# Patient Record
Sex: MALE | Race: WHITE
[De-identification: names, ages, dates, MRNs, and addresses within clinical notes are randomized per-mention and may not be internally consistent; named-entity substitution may affect disease eponyms.]

---

## 2018-05-26 ENCOUNTER — HOSPITAL ENCOUNTER (EMERGENCY)
Dept: HOSPITAL 45 - C.EDC | Age: 48
Discharge: HOME | End: 2018-05-26
Payer: COMMERCIAL

## 2018-05-26 VITALS
WEIGHT: 203.93 LBS | HEIGHT: 69.02 IN | WEIGHT: 203.93 LBS | BODY MASS INDEX: 30.2 KG/M2 | HEIGHT: 69.02 IN | BODY MASS INDEX: 30.2 KG/M2

## 2018-05-26 VITALS — OXYGEN SATURATION: 97 %

## 2018-05-26 VITALS — TEMPERATURE: 99.86 F

## 2018-05-26 VITALS — DIASTOLIC BLOOD PRESSURE: 62 MMHG | OXYGEN SATURATION: 98 % | SYSTOLIC BLOOD PRESSURE: 102 MMHG | HEART RATE: 92 BPM

## 2018-05-26 DIAGNOSIS — B34.9: Primary | ICD-10-CM

## 2018-05-26 DIAGNOSIS — R50.9: ICD-10-CM

## 2018-05-26 DIAGNOSIS — Z79.899: ICD-10-CM

## 2018-05-26 DIAGNOSIS — Z88.8: ICD-10-CM

## 2018-05-26 LAB
BASOPHILS # BLD: 0.01 K/UL (ref 0–0.2)
BASOPHILS NFR BLD: 0.2 %
BUN SERPL-MCNC: 8 MG/DL (ref 7–18)
CA-I BLD-SCNC: 1.08 MMOL/L (ref 1.12–1.32)
CALCIUM SERPL-MCNC: 8.2 MG/DL (ref 8.5–10.1)
CO2 SERPL-SCNC: 20 MMOL/L (ref 21–32)
CREAT BLD-MCNC: 1.2 MG/DL (ref 0.6–1.3)
CREAT SERPL-MCNC: 1.26 MG/DL (ref 0.6–1.4)
EOS ABS #: 0.01 K/UL (ref 0–0.5)
EOSINOPHIL NFR BLD AUTO: 161 K/UL (ref 130–400)
GLUCOSE SERPL-MCNC: 89 MG/DL (ref 70–99)
HCT VFR BLD CALC: 46.1 % (ref 42–52)
HGB BLD-MCNC: 16.1 G/DL (ref 14–18)
IG#: 0.03 K/UL (ref 0–0.02)
IMM GRANULOCYTES NFR BLD AUTO: 7.9 %
INR PPP: 1 (ref 0.9–1.1)
ISTAT POTASSIUM: 3.4 MEQ/L (ref 3.3–5)
LYMPHOCYTES # BLD: 0.46 K/UL (ref 1.2–3.4)
MCH RBC QN AUTO: 32.7 PG (ref 25–34)
MCHC RBC AUTO-ENTMCNC: 34.9 G/DL (ref 32–36)
MCV RBC AUTO: 93.5 FL (ref 80–100)
MONO ABS #: 0.01 K/UL (ref 0.11–0.59)
MONOCYTES NFR BLD: 0.2 %
NEUT ABS #: 5.31 K/UL (ref 1.4–6.5)
NEUTROPHILS # BLD AUTO: 0.2 %
NEUTROPHILS NFR BLD AUTO: 91 %
PMV BLD AUTO: 9.4 FL (ref 7.4–10.4)
POTASSIUM SERPL-SCNC: 4 MMOL/L (ref 3.5–5.1)
PTT PATIENT: 24.2 SECONDS (ref 21–31)
RED CELL DISTRIBUTION WIDTH CV: 12.8 % (ref 11.5–14.5)
RED CELL DISTRIBUTION WIDTH SD: 44 FL (ref 36.4–46.3)
SODIUM BLD-SCNC: 137 MEQ/L (ref 135–144)
SODIUM SERPL-SCNC: 137 MMOL/L (ref 136–145)
WBC # BLD AUTO: 5.83 K/UL (ref 4.8–10.8)

## 2018-05-26 NOTE — EMERGENCY ROOM VISIT NOTE
History


Report prepared by Yasir:  Ramona Bryson


Under the Supervision of:  Dr. Beto Wyman M.D.


First contact with patient:  13:07


Chief Complaint:  ILLNESS


Stated Complaint:  FEVER, SOB, SHAKINESS





History of Present Illness


The patient is a 48 year old white male with a past medical history of seizures 

who presents to the ED with a cc of an illness beginning at 1100 today. 

Positive neck pain, headache shortness of breath, nausea. Negative urinary 

symptoms. The patient reports that he was camping with his family and was 

getting ready to go for a bike ride when his symptoms came on suddenly. He 

states that he was unable to stand or talk and that he was cold and shaking. He 

reports that he does have a history of seizures but that he has not had a 

seizure in 20 years and that when he does get them he passes out. He denies any 

recent tick bites or changes in lifestyle. He also denies recent falls or 

injuries. The patient states that he has a history of a cholecystectomy. He 

reports that he drinks alcohol and chews tobacco but denies drug use.





   Source of History:  patient


   Onset:  1100 today


   Position:  other (generalized )


   Quality:  other (illness)


   Associated Symptoms:  + headache, + neck pain, + SOB, + nausea, No urinary 

symptoms





Review of Systems


See HPI for pertinent positives and negatives.  A total of ten systems were 

reviewed and were otherwise negative.





Past Medical & Surgical


Medical Problems:


(1) Seizure


Surgical Problems:


(1) Hx of cholecystectomy








Family History





No pertinent family history





Social History


Smoking Status:  Never Smoker


Smokeless Tobacco Use:  Yes


Alcohol Use:  occasionally


Marital Status:  single





Current/Historical Medications


Scheduled


Fluoxetine (Prozac), 1 DOSE PO DAILY


Lamotrigine (Lamictal), 150 MG PO DAILY





Allergies


Coded Allergies:  


     Propofol (Unverified  Adverse Reaction, Unknown, HEART RACES, 5/26/18)





Physical Exam


Vital Signs











  Date Time  Temp Pulse Resp B/P (MAP) Pulse Ox O2 Delivery O2 Flow Rate FiO2


 


5/26/18 15:30  92 18 102/62 98   


 


5/26/18 13:58  95 19 135/80 94 Room Air  


 


5/26/18 13:34  100      


 


5/26/18 13:09     97 Room Air  


 


5/26/18 13:04 37.7 107 27 147/86 97 Room Air  











Physical Exam


GENERAL: Awake, alert, mildly ill- appearing, NAD


HENT: Normocephalic, atraumatic.


EYES: Normal conjunctiva. Sclera non-icteric. Mild anisocoria right greater 

than left. Equally reactive to light. 


NECK: Supple. No nuchal rigidity. FROM.


RESPIRATORY: CTAB, no rhonchi, wheezing, crackles


CARDIAC: RRR, no MRG


ABDOMEN: Soft, NTND, BS+ 


MSK: No chest wall TTP, no LE edema, right pericervical tenderness to palpation

, full range of motion. Negative kernig's sign. Negative Brudzinski's sign


NEURO: CN 2-12 intact, 5/5 upper and lower extremity strength, no dysmetria, no 

drift, good finger to nose, no sensory deficits. Finger count grossly normal. 


SKIN: No rash or jaundice noted.





Medical Decision & Procedures


ER Provider


Diagnostic Interpretation:


Radiology results as stated below per my review and radiologist interpretation: 





NONCONTRAST HEAD CT, HEAD CTA





HISTORY:      Headache. Neck pain.





TECHNIQUE: Multiaxial CT images of the head were performed both before and after


the intravenous administration of contrast to evaluate the major cerebral


vessels. Maximum intensity projection images were also obtained.  A dose


lowering technique was utilized adhering to the principles of ALARA. 





COMPARISON:  None.





FINDINGS: There is no mass, hematoma, midline shift, or acute infarct.


Visualized intracranial internal carotid arteries, distal vertebral arteries,


and basilar artery are widely patent. There is no significant stenosis,


occlusion, or aneurysm seen within the bilateral ACAs, MCAs, or PCAs. The


calvarium and skull base are intact. The paranasal sinuses and mastoid air cells


are clear.





IMPRESSION:  


No acute intracranial abnormality. No significant stenosis, occlusion, or


aneurysm within the [place]. 











Electronically signed by:  Clarence Aguilar M.D.


5/26/2018 2:37 PM





Dictated Date/Time:  5/26/2018 2:29 PM








NONCONTRAST HEAD CT, HEAD CTA





HISTORY:      Headache. Neck pain.





TECHNIQUE: Multiaxial CT images of the head were performed both before and after


the intravenous administration of contrast to evaluate the major cerebral


vessels. Maximum intensity projection images were also obtained.  A dose


lowering technique was utilized adhering to the principles of ALARA. 





COMPARISON:  None.





FINDINGS: There is no mass, hematoma, midline shift, or acute infarct.


Visualized intracranial internal carotid arteries, distal vertebral arteries,


and basilar artery are widely patent. There is no significant stenosis,


occlusion, or aneurysm seen within the bilateral ACAs, MCAs, or PCAs. The


calvarium and skull base are intact. The paranasal sinuses and mastoid air cells


are clear.





IMPRESSION:  


No acute intracranial abnormality. No significant stenosis, occlusion, or


aneurysm within the [place]. 











Electronically signed by:  Clarence Aguilar M.D.


5/26/2018 2:37 PM





Dictated Date/Time:  5/26/2018 2:29 PM








NECK CTA





HISTORY:      stiff neck, HA, anisocoria R > L reactive to light





TECHNIQUE: Multiaxial CT images of the neck were performed following the


intravenous administration of contrast to evaluate the major cervical vessels.


Maximum intensity projection images were also obtained. All measurements were


calculated based on NASCET criteria.  A dose lowering technique was utilized


adhering to the principles of ALARA.





COMPARISON STUDY:  None.





FINDINGS: The aortic arch and proximal great vessels are widely patent.  There


is no significant stenosis, occlusion, or dissection identified within the


bilateral common carotid, internal carotid, or vertebral arteries. Moderate to


space narrowing at C5-C6 with small endplate osteophytes. There is also mild


disc space narrowing at C3-C4.





IMPRESSION:  


No significant stenosis, occlusion, or dissection identified within the carotid


or vertebral arteries.











Electronically signed by:  Clarence Aguilar M.D.


5/26/2018 2:40 PM





Dictated Date/Time:  5/26/2018 2:37 PM





Laboratory Results


5/26/18 12:42








Red Blood Count 4.93, Mean Corpuscular Volume 93.5, Mean Corpuscular Hemoglobin 

32.7, Mean Corpuscular Hemoglobin Concent 34.9, Mean Platelet Volume 9.4, 

Neutrophils (%) (Auto) 91.0, Lymphocytes (%) (Auto) 7.9, Monocytes (%) (Auto) 

0.2, Eosinophils (%) (Auto) 0.2, Basophils (%) (Auto) 0.2, Neutrophils # (Auto) 

5.31, Lymphocytes # (Auto) 0.46, Monocytes # (Auto) 0.01, Eosinophils # (Auto) 

0.01, Basophils # (Auto) 0.01





5/26/18 12:42

















Test


  5/26/18


12:42 5/26/18


13:39 5/26/18


14:45


 


White Blood Count


  5.83 K/uL


(4.8-10.8) 


  


 


 


Red Blood Count


  4.93 M/uL


(4.7-6.1) 


  


 


 


Hemoglobin


  16.1 g/dL


(14.0-18.0) 


  


 


 


Hematocrit 46.1 % (42-52)   


 


Mean Corpuscular Volume


  93.5 fL


() 


  


 


 


Mean Corpuscular Hemoglobin


  32.7 pg


(25-34) 


  


 


 


Mean Corpuscular Hemoglobin


Concent 34.9 g/dl


(32-36) 


  


 


 


Platelet Count


  161 K/uL


(130-400) 


  


 


 


Mean Platelet Volume


  9.4 fL


(7.4-10.4) 


  


 


 


Neutrophils (%) (Auto) 91.0 %   


 


Lymphocytes (%) (Auto) 7.9 %   


 


Monocytes (%) (Auto) 0.2 %   


 


Eosinophils (%) (Auto) 0.2 %   


 


Basophils (%) (Auto) 0.2 %   


 


Neutrophils # (Auto)


  5.31 K/uL


(1.4-6.5) 


  


 


 


Lymphocytes # (Auto)


  0.46 K/uL


(1.2-3.4) 


  


 


 


Monocytes # (Auto)


  0.01 K/uL


(0.11-0.59) 


  


 


 


Eosinophils # (Auto)


  0.01 K/uL


(0-0.5) 


  


 


 


Basophils # (Auto)


  0.01 K/uL


(0-0.2) 


  


 


 


RDW Standard Deviation


  44.0 fL


(36.4-46.3) 


  


 


 


RDW Coefficient of Variation


  12.8 %


(11.5-14.5) 


  


 


 


Immature Granulocyte % (Auto) 0.5 %   


 


Immature Granulocyte # (Auto)


  0.03 K/uL


(0.00-0.02) 


  


 


 


Prothrombin Time


  10.4 SECONDS


(9.0-12.0) 


  


 


 


Prothromb Time International


Ratio 1.0 (0.9-1.1) 


  


  


 


 


Activated Partial


Thromboplast Time 24.2 SECONDS


(21.0-31.0) 


  


 


 


Partial Thromboplastin Ratio 0.9   


 


Est Creatinine Clear Calc


Drug Dose 79.7 ml/min 


  


  


 


 


Estimated GFR (


American) 77.1 


  


  


 


 


Estimated GFR (Non-


American 66.5 


  


  


 


 


BUN/Creatinine Ratio 6.4 (10-20)   


 


Calcium Level


  8.2 mg/dl


(8.5-10.1) 


  


 


 


Magnesium Level


  1.8 mg/dl


(1.8-2.4) 


  


 


 


Lyme Disease IgG Antibody NEG (NEG)   


 


Lyme Disease IgM Antibody NEG (NEG)   


 


Bedside Hemoglobin


  


  15.3 g/dl


(14.0-18.0) 


 


 


Bedside Hematocrit  45 % (42-52)  


 


Bedside Sodium


  


  137 mEq/L


(135-144) 


 


 


Bedside Potassium


  


  3.4 mEq/L


(3.3-5.0) 


 


 


Bedside Chloride


  


  102 mEq/L


(101-112) 


 


 


Bedside Total CO2


  


  16 mEq/l


(24-31) 


 


 


Anion Gap


  


  22.0 mmol/L


(16-25) 


 


 


Bedside Blood Urea Nitrogen  9 mg/dl (7-18)  


 


Bedside Creatinine


  


  1.2 mg/dl


(0.6-1.3) 


 


 


Bedside Glucose (other)


  


  108 mg/dl


(70-99) 


 


 


Bedside Ionized Calcium (Karen)


  


  1.08 mmol/l


(1.12-1.32) 


 


 


Urine Color   YELLOW 


 


Urine Appearance   CLEAR (CLEAR) 


 


Urine pH   7.0 (4.5-7.5) 


 


Urine Specific Gravity


  


  


  1.017


(1.000-1.030)


 


Urine Protein   NEG (NEG) 


 


Urine Glucose (UA)   NEG (NEG) 


 


Urine Ketones   NEG (NEG) 


 


Urine Occult Blood   NEG (NEG) 


 


Urine Nitrite   NEG (NEG) 


 


Urine Bilirubin   NEG (NEG) 


 


Urine Urobilinogen   NEG (NEG) 


 


Urine Leukocyte Esterase   NEG (NEG) 


 


Urine WBC (Auto)   0 /hpf (0-5) 


 


Urine RBC (Auto)   0-4 /hpf (0-4) 


 


Urine Hyaline Casts (Auto)   0 /lpf (0-5) 


 


Urine Epithelial Cells (Auto)   0-5 /lpf (0-5) 


 


Urine Bacteria (Auto)   NEG (NEG) 





Laboratory results reviewed by me





Medications Administered











 Medications


  (Trade)  Dose


 Ordered  Sig/Kalpesh


 Route  Start Time


 Stop Time Status Last Admin


Dose Admin


 


 Prochlorperazine


 Edisylate


  (Compazine Inj)  10 mg  NOW  STAT


 IV  5/26/18 13:24


 5/26/18 13:27 DC 5/26/18 14:01


10 MG


 


 Acetaminophen


  (Tylenol Tab)  1,000 mg  NOW  STAT


 PO  5/26/18 13:24


 5/26/18 13:27 DC 5/26/18 14:01


1,000 MG


 


 Diphenhydramine


 HCl


  (Benadryl Inj)  50 mg  NOW  STAT


 IV  5/26/18 13:24


 5/26/18 13:27 DC 5/26/18 14:00


50 MG


 


 Sodium Chloride  1,000 ml @ 


 999 mls/hr  Q1H1M STAT


 IV  5/26/18 13:24


 5/26/18 14:24 DC 5/26/18 13:24


999 MLS/HR


 


 Sodium Chloride  500 ml @ 


 999 mls/hr  Q31M STAT


 IV  5/26/18 14:42


 5/26/18 15:12 DC 5/26/18 14:52


999 MLS/HR











ED Course


1317: The patient was evaluated in room C5. A complete history and physical 

exam was performed.





1440: I checked on the patient and his symptoms have resolved. 





1505: I reevaluated the patient. Discussed results and discharge instructions: 

He verbalized understanding and agreement. The patient is ready for discharge.





Medical Decision


Nursing notes reviewed. Ancillary studies and prior records reviewed. 





The patient is a 48 year old white male with a past medical history of seizures 

who presents to the ED with a cc of an illness beginning at 1100 today.





Differential diagnosis:


Etiologies such as migraine headache, meningitis, sinusitis, CO exposure, ICH, 

SAH, infection, tumor, headache, sinus thrombosis, arterial dissection, as well 

as others were entertained.





Patient was seen and evaluated the bedside.  Patient did have some Reiger's as 

well as feeling like he is in headache and stiff neck.  On exam the patient did 

complain of mild headache but had a nonfocal neurologic exam.  Of note though 

the patient did have some mild anisocoria right greater than left.  His pupils 

were equally reactive to light.  The patient did not really have true 

meningismus.  The patient had a negative Brudzinski's and Kernig signs.  

Patient denies any blood thinning medications and denies any trauma.  No recent 

tick bites with the patient has been camping.





Patient did blood work completed along with CT brain, CTA of the head and neck 

and some medications for symptom control.





Of note the patient was reported to have a fever in the ambulance.  Patient was 

given some antipyretics prior to arrival.  Patient has not been febrile here.  

The patient is feeling much improved after his headache treatment.  The patient 

did have a negative CT brain and CT of the head neck.  Given this and the 

relatively short time course I do not believe that the patient lack of 

meningismus.  Less likely bacterial meningitis.





Patient was given strict follow-up, discharge, and return precautions.  All 

questions were answered.  Patient was deemed suitable for outpatient follow-up 

at this time.  Patient agreed with the plan of care and was safely discharged 

home.





Medication Reconcilliation


Current Medication List:  was personally reviewed by me





Blood Pressure Screening


Patient's blood pressure:  Elevated blood pressure


Blood pressure disposition:  Elevated BP felt to be situational





Impression





 Primary Impression:  


 Viral illness


 Additional Impression:  


 Fever





Scribe Attestation


The scribe's documentation has been prepared under my direction and personally 

reviewed by me in its entirety. I confirm that the note above accurately 

reflects all work, treatment, procedures, and medical decision making performed 

by me.





Departure Information


Dispostion


Home / Self-Care





Forms


HOME CARE DOCUMENTATION FORM,                                                 

               IMPORTANT VISIT INFORMATION, WORK / SCHOOL INSTRUCTIONS





Patient Instructions


ED Fever Control, My [facility]





Additional Instructions





Please return to the emergency department if you have worsening or recurrent 

symptoms not amenable to at-home treatment.  Please call for a follow-up 

appointment with her primary care physician.  Please take your medications as 

prescribed.  If you have other concerns and/or complaints please feel free to 

also call your primary care physician's office or return the ED for further 

evaluation, management, and treatment.





You may take 800 mg Ibuprofen every 6 hours as needed for pain/fever with food 

unless told by your physician not to take NSAIDs. You may take tylenol 1000 mg 

every 6 hours as needed for pain/fever unless told by your physician to not 

take it or have liver problems. You may take motrin and tylenol separately or 

at the same time. 





Take your medications as prescribed.





You have been examined and treated today on an emergency basis only. This is 

not a substitute for, or an effort to provide, complete comprehensive medical 

care. It is impossible to recognize and treat all injuries or illnesses in a 

single emergency department visit. It is therefore important that you follow up 

closely with [facility], your PCP, and/or your specialist(s). 

Call as soon as possible for an appointment.





Thank you for your time and consideration. I look forward to speaking with you 

again soon. Please don't hesitate to call us if you have any questions.





Problem Qualifiers








 Additional Impression:  


 Fever


 Fever type:  unspecified  Qualified Codes:  R50.9 - Fever, unspecified

## 2018-05-26 NOTE — DIAGNOSTIC IMAGING REPORT
NONCONTRAST HEAD CT, HEAD CTA



HISTORY:      Headache. Neck pain.



TECHNIQUE: Multiaxial CT images of the head were performed both before and after

the intravenous administration of contrast to evaluate the major cerebral

vessels. Maximum intensity projection images were also obtained.  A dose

lowering technique was utilized adhering to the principles of ALARA. 



COMPARISON:  None.



FINDINGS: There is no mass, hematoma, midline shift, or acute infarct.

Visualized intracranial internal carotid arteries, distal vertebral arteries,

and basilar artery are widely patent. There is no significant stenosis,

occlusion, or aneurysm seen within the bilateral ACAs, MCAs, or PCAs. The

calvarium and skull base are intact. The paranasal sinuses and mastoid air cells

are clear.



IMPRESSION:  

No acute intracranial abnormality. No significant stenosis, occlusion, or

aneurysm within the [place]. 







Electronically signed by:  Clarence Aguilar M.D.

5/26/2018 2:37 PM



Dictated Date/Time:  5/26/2018 2:29 PM

## 2018-05-26 NOTE — DIAGNOSTIC IMAGING REPORT
NECK CTA



HISTORY:      stiff neck, HA, anisocoria R > L reactive to light



TECHNIQUE: Multiaxial CT images of the neck were performed following the

intravenous administration of contrast to evaluate the major cervical vessels.

Maximum intensity projection images were also obtained. All measurements were

calculated based on NASCET criteria.  A dose lowering technique was utilized

adhering to the principles of ALARA.



COMPARISON STUDY:  None.



FINDINGS: The aortic arch and proximal great vessels are widely patent.  There

is no significant stenosis, occlusion, or dissection identified within the

bilateral common carotid, internal carotid, or vertebral arteries. Moderate to

space narrowing at C5-C6 with small endplate osteophytes. There is also mild

disc space narrowing at C3-C4.



IMPRESSION:  

No significant stenosis, occlusion, or dissection identified within the carotid

or vertebral arteries.







Electronically signed by:  Clarence Aguilar M.D.

5/26/2018 2:40 PM



Dictated Date/Time:  5/26/2018 2:37 PM

## 2018-05-26 NOTE — DIAGNOSTIC IMAGING REPORT
NONCONTRAST HEAD CT, HEAD CTA



HISTORY:      Headache. Neck pain.



TECHNIQUE: Multiaxial CT images of the head were performed both before and after

the intravenous administration of contrast to evaluate the major cerebral

vessels. Maximum intensity projection images were also obtained.  A dose

lowering technique was utilized adhering to the principles of ALARA. 



COMPARISON:  None.



FINDINGS: There is no mass, hematoma, midline shift, or acute infarct.

Visualized intracranial internal carotid arteries, distal vertebral arteries,

and basilar artery are widely patent. There is no significant stenosis,

occlusion, or aneurysm seen within the bilateral ACAs, MCAs, or PCAs. The

calvarium and skull base are intact. The paranasal sinuses and mastoid air cells

are clear.



IMPRESSION:  

No acute intracranial abnormality. No significant stenosis, occlusion, or

aneurysm within the [place]. 







Electronically signed by:  Clarence Aguilar M.D.

5/26/2018 2:37 PM



Dictated Date/Time:  5/26/2018 2:29 PM